# Patient Record
Sex: FEMALE | Race: WHITE | NOT HISPANIC OR LATINO | Employment: FULL TIME | URBAN - METROPOLITAN AREA
[De-identification: names, ages, dates, MRNs, and addresses within clinical notes are randomized per-mention and may not be internally consistent; named-entity substitution may affect disease eponyms.]

---

## 2019-11-12 ENCOUNTER — OFFICE VISIT (OUTPATIENT)
Dept: PODIATRY | Facility: CLINIC | Age: 24
End: 2019-11-12

## 2019-11-12 VITALS
WEIGHT: 203 LBS | SYSTOLIC BLOOD PRESSURE: 133 MMHG | HEART RATE: 60 BPM | BODY MASS INDEX: 29.06 KG/M2 | DIASTOLIC BLOOD PRESSURE: 82 MMHG | HEIGHT: 70 IN

## 2019-11-12 DIAGNOSIS — S93.601A FOOT SPRAIN, RIGHT, INITIAL ENCOUNTER: Primary | ICD-10-CM

## 2019-11-12 PROCEDURE — 97760 ORTHOTIC MGMT&TRAING 1ST ENC: CPT | Performed by: PODIATRIST

## 2019-11-12 PROCEDURE — 99203 OFFICE O/P NEW LOW 30 MIN: CPT | Performed by: PODIATRIST

## 2019-11-12 RX ORDER — NORETHINDRONE ACETATE AND ETHINYL ESTRADIOL AND FERROUS FUMARATE 1MG-20(24)
1 KIT ORAL DAILY
Refills: 0 | COMMUNITY
Start: 2019-11-04

## 2019-11-12 NOTE — PATIENT INSTRUCTIONS
Foot Pain  Many things can cause foot pain. Some common causes are:  · An injury.  · A sprain.  · Arthritis.  · Blisters.  · Bunions.  Follow these instructions at home:  Pay attention to any changes in your symptoms. Take these actions to help with your discomfort:  · If directed, put ice on the affected area:  ? Put ice in a plastic bag.  ? Place a towel between your skin and the bag.  ? Leave the ice on for 15-20 minutes, 3?4 times a day for 2 days.  · Take over-the-counter and prescription medicines only as told by your health care provider.  · Wear comfortable, supportive shoes that fit you well. Do not wear high heels.  · Do not stand or walk for long periods of time.  · Do not lift a lot of weight. This can put added pressure on your feet.  · Do stretches to relieve foot pain and stiffness as told by your health care provider.  · Rub your foot gently.  · Keep your feet clean and dry.  Contact a health care provider if:  · Your pain does not get better after a few days of self-care.  · Your pain gets worse.  · You cannot stand on your foot.  Get help right away if:  · Your foot is numb or tingling.  · Your foot or toes are swollen.  · Your foot or toes turn white or blue.  · You have warmth and redness along your foot.  This information is not intended to replace advice given to you by your health care provider. Make sure you discuss any questions you have with your health care provider.  Document Released: 01/13/2017 Document Revised: 05/25/2017 Document Reviewed: 01/13/2016  Elsevier Interactive Patient Education © 2019 [employer]

## 2019-11-12 NOTE — PROGRESS NOTES
11/12/2019  Foot and Ankle Surgery - New Patient   Provider: Dr. Sundeep Jean DPM  Location: [facility]    Subjective:  Marilyn Heredia is a 23 y.o. female.     Chief Complaint   Patient presents with   • Right Ankle - Pain       HPI: Patient is a 23-year-old female that presents with 1 week duration of right foot pain.  She states that she is up on her feet fairly significantly at work.  She started walking and noticed pain to the medial column of the foot.  She is unaware of any injury.  She has not had symptoms like this in the past.  She did report to the urgent care center after the injury where she was given a steroid and has noticed some improvement.  She states that she continues to limp and is walking on the outside of her foot.  She continues to rate the pain as a 6 out of 10 with weightbearing.  No other issues today    Allergies   Allergen Reactions   • Shellfish-Derived Products Anaphylaxis       History reviewed. No pertinent past medical history.    History reviewed. No pertinent surgical history.    Family History   Adopted: Yes   Family history unknown: Yes       Social History     Socioeconomic History   • Marital status: Single     Spouse name: Not on file   • Number of children: Not on file   • Years of education: Not on file   • Highest education level: Not on file   Tobacco Use   • Smoking status: Never Smoker   • Smokeless tobacco: Never Used   Substance and Sexual Activity   • Alcohol use: Yes     Comment: socially   • Drug use: No   • Sexual activity: Defer        Current Outpatient Medications on File Prior to Visit   Medication Sig Dispense Refill   • MELODETTA 24 FE 1-20 MG-MCG(24) chewable tablet Chew 1 tablet Daily.  0   • [DISCONTINUED] predniSONE (DELTASONE) 20 MG tablet 3 for 2 days, 2 for 2 days, 1 for 2 days 12 tablet 0     No current facility-administered medications on file prior to visit.        Review of Systems:  General: Denies fever, chills, fatigue, and  "weakness.  Eyes: Denies vision loss, blurry vision, and excessive redness.  ENT: Denies hearing issues and difficulty swallowing.  Cardiovascular: Denies palpitations, chest pain, or syncopal episodes.  Respiratory: Denies shortness of breath, wheezing, and coughing.  GI: Denies abdominal pain, nausea, and vomiting.   : Denies frequency, hematuria, and urgency.  Musculoskeletal: + Right foot pain  Derm: Denies rash, open wounds, or suspicious lesions.  Neuro: Denies headaches, numbness, loss of coordination, and tremors.  Psych: Denies anxiety and depression.  Endocrine: Denies temperature intolerance and changes in appetite.  Heme: Denies bleeding disorders or abnormal bruising.     Objective   /82   Pulse 60   Ht 177.8 cm (70\")   Wt 92.1 kg (203 lb)   BMI 29.13 kg/m²     Foot/Ankle Exam:       General:   Appearance: appears stated age and healthy    Orientation: AAOx3    Affect: appropriate    Gait: antalgic       Right Foot/Ankle:      Vascular   Normal vascular exam    Dorsalis pedis:  2+  Posterior tibial:  2+  Skin Temperature: warm    Edema Grading:  None  CFT:  < 3 seconds  Pedal Hair Growth:  Present  Varicosities: none       Neurologic   Normal sensation    Light touch sensation:  Normal  Vibratory sensation:  Normal  Hot/Cold sensation: normal    Achilles reflex:  2+     Musculoskeletal   Ecchymosis:  None  Tenderness: arch, dorsal foot and posterior tibial tendon    Arch:  Normal  Hallux valgus: Yes       Muscle Strength   Normal strength    Foot dorsiflexion:  5  Foot plantar flexion:  5  Foot inversion:  5  Foot eversion:  5     Range of Motion   Foot and ankle ROM within normal limits       Dermatologic   Skin: skin intact        Additional Comments Mild guarding with muscle strength against resistance.  No gross deformity or instability.  Modest discomfort to the medial midfoot region.      Assessment/Plan   Marilyn was seen today for pain.    Diagnoses and all orders for this " visit:    Foot sprain, right, initial encounter      Patient presents with 1 week duration of right foot pain.  Imaging was reviewed showing no obvious fracture dislocation.  She does not recall any injury.  Symptoms appear to be isolated towards the medial column at the level of the midfoot.  Symptoms could be secondary to foot strain.  I have recommended that we place her in a cam boot for ambulation assistance over the next 2 weeks.  Greater than 15 minutes was spent discussing the proper use and effects.  I have asked that she gradually discontinue the boot and return to her regular pair of shoes.  I have advised her to obtain a pair of over-the-counter arch supports with a metatarsal pad for forefoot offloading.  She is to perform at home stretching and strengthening exercises.  We did discuss rice therapy as well as proper use of anti-inflammatories.  I would like to see her in 4 weeks for reevaluation and weightbearing imaging of the foot if she continues to have pain    No orders of the defined types were placed in this encounter.       Note is dictated utilizing voice recognition software. Unfortunately this leads to occasional typographical errors. I apologize in advance if the situation occurs. If questions occur please do not hesitate to call our office.

## 2019-12-10 ENCOUNTER — OFFICE VISIT (OUTPATIENT)
Dept: PODIATRY | Facility: CLINIC | Age: 24
End: 2019-12-10

## 2019-12-10 VITALS
DIASTOLIC BLOOD PRESSURE: 89 MMHG | HEIGHT: 70 IN | SYSTOLIC BLOOD PRESSURE: 134 MMHG | HEART RATE: 59 BPM | BODY MASS INDEX: 28.49 KG/M2 | WEIGHT: 199 LBS

## 2019-12-10 DIAGNOSIS — S93.601A FOOT SPRAIN, RIGHT, INITIAL ENCOUNTER: Primary | ICD-10-CM

## 2019-12-10 PROCEDURE — 99213 OFFICE O/P EST LOW 20 MIN: CPT | Performed by: PODIATRIST

## 2019-12-10 NOTE — PROGRESS NOTES
"12/10/2019  Foot and Ankle Surgery - Established Patient/Follow-up  Provider: Dr. Sundeep Jean DPM  Location: [facility]    Subjective:  Marilyn Heredia is a 24 y.o. female.     Chief Complaint   Patient presents with   • Right Foot - Follow-up, Pain       HPI: Patient returns for issues regarding her right foot.  She was seen previously after a foot sprain.  She has continued to wear the cam boot and has noticed significant improvement.  She does notice mild discomfort in a regular shoe and was unsure if she should return to baseline activity.  She denies any other issues today.  She has noticed significant improvement in the swelling.    Allergies   Allergen Reactions   • Shellfish-Derived Products Anaphylaxis       Current Outpatient Medications on File Prior to Visit   Medication Sig Dispense Refill   • MELODETTA 24 FE 1-20 MG-MCG(24) chewable tablet Chew 1 tablet Daily.  0     No current facility-administered medications on file prior to visit.        Objective   /89   Pulse 59   Ht 177.8 cm (70\")   Wt 90.3 kg (199 lb)   BMI 28.55 kg/m²      General:   Appearance: appears stated age and healthy    Orientation: AAOx3    Affect: appropriate    Gait: antalgic        Right Foot/Ankle:       Vascular   Normal vascular exam    Dorsalis pedis:  2+  Posterior tibial:  2+  Skin Temperature: warm    Edema Grading:  None  CFT:  < 3 seconds  Pedal Hair Growth:  Present  Varicosities: none        Neurologic   Normal sensation    Light touch sensation:  Normal  Vibratory sensation:  Normal  Hot/Cold sensation: normal    Achilles reflex:  2+      Musculoskeletal   Ecchymosis:  None  Tenderness:  None   Arch:  Normal  Hallux valgus: Yes        Muscle Strength   Normal strength    Foot dorsiflexion:  5  Foot plantar flexion:  5  Foot inversion:  5  Foot eversion:  5      Range of Motion   Foot and ankle ROM within normal limits        Dermatologic   Skin: skin intact        Additional Comments no " significant pain with palpation.  No progressive deformity or instability    Assessment/Plan   Marilyn was seen today for follow-up and pain.    Diagnoses and all orders for this visit:    Foot sprain, right, initial encounter  -     XR Foot 3+ View Right      Patient states that she is doing much better.  She has noticed decreased pain and swelling to the foot.  She only notices mild discomfort to the first metatarsal phalangeal joint.  I explained that this is likely secondary to lack of activity in the cam boot.  I would like her to discontinue the boot and return to baseline activity.  I have no restrictions for her at this time.  She may return to work without restrictions.  She is at maximal medical improvement.  She is to see me on an as-needed basis.    Orders Placed This Encounter   Procedures   • XR Foot 3+ View Right     Weight Bearing     Order Specific Question:   Reason for Exam:     Answer:   Right foot F/U x ray pt still havingpain RM 9 WB     Order Specific Question:   Patient Pregnant     Answer:   No          Note is dictated utilizing voice recognition software. Unfortunately this leads to occasional typographical errors. I apologize in advance if the situation occurs. If questions occur please do not hesitate to call our office.